# Patient Record
(demographics unavailable — no encounter records)

---

## 2024-10-29 NOTE — REVIEW OF SYSTEMS
[Negative] : Heme/Lymph [Chest Discomfort] : chest discomfort [Weight Gain (___ Lbs)] : no recent weight gain [Weight Loss (___ Lbs)] : no recent weight loss [Dyspnea on exertion] : not dyspnea during exertion [Palpitations] : no palpitations [Syncope] : no syncope [FreeTextEntry5] : See HPI [FreeTextEntry7] : See HPI

## 2024-10-29 NOTE — REASON FOR VISIT
[FreeTextEntry1] : Cardiology consultation for patient with old left bundle branch block with new fluttering and perhaps symptomatic VPCs. Stress echocardiogram revealed no ischemia or cardiomyopathy, rate related left bundle branch block and symptomatic VPCs. She was started on bisoprolol 5 mg daily. Here in follow-up after brief hospitalization for chest pain.

## 2024-10-29 NOTE — ASSESSMENT
[FreeTextEntry1] : Mrs. Garcia has an old left bundle branch block with a history of rheumatic heart disease and a somewhat abnormal cardiac exam. Stress echocardiogram 8 years ago, showed mild calcification of the aortic valve and otherwise normal LV function, and no ischemia. No arrhythmias were noted then. She was having intermittent palpitations. It sounded like just individual extra beats. There was one VPC at the end of her EKG tracing with her internist that she recalled feeling during the test. Doing well overall with only rare palpitations. Does not exercise at all. In March, 2019, found to have a narrow QRS at rest with rate-related left bundle branch block at heart rate 120 and above. Had a very inappropriate sinus tachycardia to minimal exercise. Mild progression of her aortic stenosis and mitral regurgitation with still normal LV and RV function. Long discussion about the benefits of keeping her heart rate down both in terms of exercise capacity, keeping away the left bundle branch block as this occasionally can affect LV contractility, although again, the patient is not very symptomatic. Elected to try bisoprolol 5 mg which seems to be working very well. Told her we could always try a higher dose but for now she wants to remain on this dose. She continues to do well and had stress echocardiogram 12/2020. No left bundle branch block until heart rate was 130 this time. She was able to do more than previously and the only abnormality was the rate related left bundle branch block and the abnormal septal motion post exercise seen because of the left bundle branch block. Mild AS and MR.. Was here June 13, 2023 and has been mostly asymptomatic since with no complaints, no Covid issues. Exam mostly unchanged with murmurs of AS and mild MR. No palpitations. No left bundle branch block again today. Medicines unchanged. Last ETT and echo almost 3 years. Still ALVAREZ.  Patient returned for echo on January ninth and then here today January 18 for stress echo and follow-up. Seems mostly asymptomatic unless she really pushes herself. Exam remarkable for her aortic stenosis murmur mostly unchanged. May be an S4 noted today. Her echo on the ninth as mentioned just had mild to moderate aortic stenosis, possibly ROBERT mostly proximal septum and otherwise normal LVEF. On his stress test today she was able to do 7 minutes and got her heart rate to 135 without going into a left bundle branch block. Also no VPCs so her beta-blocker is doing a good job. Labs will be checked especially proBNP. If no change she will come back in 6 months just for a visit and then we can be addressed if and when she needs her next echo and neck stress test.  Patient returned July 18, 2024. Has a couple of vague unusual symptoms that only happen every few days and last just a few seconds which may be related to her VPCs. No symptoms to suggest aortic valve disease although her aortic stenosis murmur is a little louder today. Healthy overall and labs will be sent. If proBNP elevated for the first time, would bring her back already for an echo with her visit in January. Otherwise we will just have her return in January for clinical visit and then decide if there is any need for further stress testing or echo etc.      Patient returns today October 29, 2024 after a brief hospitalization last week for chest pain including a CT angio of her coronaries as mentioned above.  She does have a mild nonobstructive plaque around the proximal LAD and proximal RCA with a low calcium score of 109.  Her symptoms do not sound ischemic and I do not believe she needs further cardiac workup at this time and will be having further GI workup as mentioned.  She also had an echo with results as mentioned above with mild valvular disease and normal LV and RV function.  Labs were reviewed as well including lipid profile as mentioned.  For now I would not change any medication and I reassured her that she has had a fairly good workup of her heart.  Her left bundle branch block is back today with a mild first-degree AV block as well but she has no symptoms of bradycardia syncope near syncope etc.

## 2024-10-29 NOTE — PHYSICAL EXAM
[General Appearance - Well Developed] : well developed [Normal Appearance] : normal appearance [Well Groomed] : well groomed [General Appearance - Well Nourished] : well nourished [No Deformities] : no deformities [General Appearance - In No Acute Distress] : no acute distress [Normal Conjunctiva] : the conjunctiva exhibited no abnormalities [Eyelids - No Xanthelasma] : the eyelids demonstrated no xanthelasmas [Normal Oral Mucosa] : normal oral mucosa [No Oral Pallor] : no oral pallor [No Oral Cyanosis] : no oral cyanosis [Normal Jugular Venous A Waves Present] : normal jugular venous A waves present [Normal Jugular Venous V Waves Present] : normal jugular venous V waves present [No Jugular Venous Haque A Waves] : no jugular venous haque A waves [Respiration, Rhythm And Depth] : normal respiratory rhythm and effort [Exaggerated Use Of Accessory Muscles For Inspiration] : no accessory muscle use [Auscultation Breath Sounds / Voice Sounds] : lungs were clear to auscultation bilaterally [Heart Rate And Rhythm] : heart rate and rhythm were normal [Abdomen Soft] : soft [Abdomen Mass (___ Cm)] : no abdominal mass palpated [Abnormal Walk] : normal gait [Gait - Sufficient For Exercise Testing] : the gait was sufficient for exercise testing [Nail Clubbing] : no clubbing of the fingernails [Cyanosis, Localized] : no localized cyanosis [Petechial Hemorrhages (___cm)] : no petechial hemorrhages [Skin Color & Pigmentation] : normal skin color and pigmentation [] : no rash [No Venous Stasis] : no venous stasis [Skin Lesions] : no skin lesions [No Skin Ulcers] : no skin ulcer [No Xanthoma] : no  xanthoma was observed [Oriented To Time, Place, And Person] : oriented to person, place, and time [Affect] : the affect was normal [Mood] : the mood was normal [FreeTextEntry1] : Not  anxious.

## 2024-10-29 NOTE — HISTORY OF PRESENT ILLNESS
[FreeTextEntry1] : October 3, 2012. Patient is a 60-year-old woman who went for her annual physical with her internist. He found a left bundle branch block which was new from previous EKG one year ago. The patient has a history of rheumatic fever as a child and was even hospitalized for about 5 days. At that time there was no cardiac involvement. She is unaware of any cardiac involvement since then. She thinks she had a normal echocardiogram about 10 years ago. She denies chest pain or shortness of breath and she is fairly active and exercises without symptoms. In terms of risk factors for coronary artery disease, there is hyperlipidemia and a borderline family history with a father who had an MI and  at age 78. She is a nonsmoker and has never been told of hypertension or diabetes. 2012. Patient returned for stress echocardiogram. No evidence of ischemia or arrhythmias to a heart rate of 169, blood pressure 180/80. Normal echocardiogram except for paradoxical septal motion from left bundle branch block. No echocardiographic evidence of ischemia. 2018. Patient last here 5-1/2 years ago. Her new symptom now is some fluttering in her chest, which can last all day and then can go days without it. Just seems like single extra beats. While having an EKG in her internist's office she had a VPC at the end, which was symptomatic, and similar to her symptoms. No dizziness, chest pain, shortness of breath. No interval medical issues in terms of cardiac disease, just an epidural injection in her neck in the past and negative colonoscopy 4 years ago. She had recent labs that were within normal limits and a cholesterol of 196, HDL 60, , triglycerides 120. EKG showed left bundle branch block that was unchanged.  2018. Patient returned for stress echocardiogram. Exercised only 4 minutes to a heart rate 156. Left bundle branch block. No chest pain. No echocardiographic evidence of ischemia. Resting echo with mild MR and mild to moderate AS with peak gradient 18. Normal LV function other than paradoxical septal motion. Normal RV function 2019. Patient here for followup and for stress echocardiogram. No real symptoms, but not really exercising much at all. Echocardiogram with slightly worse AS and MR. Normal LV function. A narrow QRS at rest. Develops left bundle branch block with heart rate 120 in stage I and has very abnormally high heart rate response to mild exercise. Mild septal wall motion abnormality in recovery from left bundle branch block, but no suspicion for ischemia. After long discussion with the patient about her symptomatic VPCs, her very poor heart rate control and her rate related left bundle branch block, elected to try low-dose beta blocker with bisoprolol 5 mg daily. 2019. Patient returns in followup. Tolerating the bisoprolol. Unclear if she gets less palpitations or less anxious. No negative side effects. 2019.  Patient here in follow-up.  EKG is sinus rhythm at 60 and is a normal tracing, no left bundle branch block.  Had a full physical with labs with Dr. Baxter 2 weeks ago, results pending.  Very rare palpitations now.  She did stop her amlodipine 5 days ago because her home machine was getting readings of 80 and 90 systolic even though she felt perfectly fine.  She will bring the machine in next time to compare. 2020.  Patient here in follow-up.  Her blood pressure is excellent at 113/72 her weight is up 5 pounds.  Her EKG shows sinus rhythm with a narrow complex and is a normal tracing.  No left bundle branch block.  No chest pain shortness of breath, just upset that she gained 5 pounds because of inactivity and COVID. 2020.  Patient returns for follow-up and for stress echocardiogram.  Has been feeling well.  No Covid issues.  Had labs with Dr. Baxter on  with a normal CBC, normal kidney function with potassium 5.4, cholesterol 164, triglycerides 118, HDL 54, LDL 86.  Normal thyroid function and hemoglobin A1c 5.4.  She was able to exercise for 7 minutes this time without chest pain just shortness of breath.  Did not develop a left bundle branch block until heart rate was 130 in stage III and up until that point there were no ST changes.  Absolutely no VPCs.  In recovery with a left bundle branch block finally resolved at about 3 minutes with a heart rate of 100.  The echocardiogram post exercise just had some abnormal septal motion from the left bundle branch block but otherwise no ischemia and the mild aortic stenosis was really unchanged. 2021.  Patient here in follow-up.  Remains with sinus bradycardia at 57 with a narrow QRS, no left bundle branch block, normal ECG.  Blood pressure excellent.  Weight up 3 pounds.  No interval medical issues, hospital visits, emergency room visits change in medication etc.  As a matter fact she thinks the last doctor she saw was me last December.  No chest pain shortness of breath or palpitations. 2021.  Patient returns in follow-up.  Remains in sinus rhythm at 65 again with a narrow complex QRS.  Had labs on  with Dr. Baxter.  Normal CBC.  Potassium 6.4 although probably hemolyzed.  BUN 19 creatinine 0.9.  Normal LFTs.  Cholesterol 194, triglycerides 137, HDL 56, .  Normal TSH normal CK and does have high antibodies for Covid.  Did not have her booster yet.  Did have a flu shot.  No chest pain shortness of breath or any medical issues in the interim. 2022.  Patient returns here in follow-up for clearance for upcoming cataract surgery.  She denies any new chest pain, shortness of breath, palpitations etc.  No COVID issues.  She had the 2 vaccines but not have the booster.  No interval hospitalizations or emergency room visits or any procedures.  She will need both eyes done and the right eye is scheduled for August.  Her EKG here is sinus rhythm at 57 with a narrow complex QRS, no left bundle branch block.  There has been no change in her medications. 2022.  Patient had no problem with cataract surgery, had both eyes done and excellent results.  No interval medical or cardiac issues.  No left bundle branch block last couple of visits and none when she went for the cataract surgery.  No COVID issues.  Still one of the few people who has never had COVID.  Labs from Dr. Jesus Baxter dated  with normal CBC, cholesterol 189, triglycerides 105, HDL 63, .  Chemistries within normal limits with potassium 5.1 and normal renal function.  Normal TSH as well. 2023. Doing well, no complaints. Still never had Covid. No LBBB today. 2024. Patient came for 2D echo. Normal LVEF at 65 to 70%. Normal diastolic function. Mild to moderate AS with a valve area 1.45 cm and no AI. Mild MR. Mild TR with PAS 25. Normal RV size and function. Patient scheduled for stress echo on .   2024. Patient returns for stress echo along with follow-up. No real complaints but has not really done much exercise since her last stress test and was a little nervous about it.  In fact she was able to do 7 minutes again this time reaching a heart rate of 135 without developing the left bundle branch block.  No chest pain.  More fatigued than shortness of breath.  No ST changes.  No echocardiographic evidence of ischemia. She denies any interval medical issues or change in medication.  She claims she had labs in November with her PCP and as far she knows all was okay.  Most likely did not have a proBNP.  We reviewed her echo results that were done on the  as well (she got nervous when she saw the words stenosis by the aortic valve) 2024.  Patient returns in follow-up.  EKG is sinus rhythm, normal tracing, no left bundle branch block.  Blood pressure excellent and weight stable. She has 2 symptoms, 1 what sounds like a sudden something coming up her chest and may be into her throat and the other is she feels like gets a little pause or dizziness or lightheadedness for a second and then comes right back.  Perhaps 1 or both are VPCs with compensatory pause but they are not that frequent to be of concern.  So far they are not frequent enough that she would want a medicine to suppress them.  No interval hospitalizations medical or cardiac issues or change in medication.  She does need a refill on her bisoprolol.  No symptoms of aortic valve disease so far. "(She moved to [place] and did not realize how long it would take her to come here if it is during rush hour.) 2024.  Patient returns in follow-up.  She was having some chest discomfort which was midsternal but on further description seems to be more of a pulsating or pounding nature.  She had recently had a GI workup including endoscopy and was told everything was fine and that she should get her heart checked out.  She went to [facility] and had an extensive workup.  Labs including troponin were normal.  EKGs were normal.  She had a CT angio to rule out pulmonary emboli.  She also had a CT angio of her coronary arteries.  The calcium score was only 109 with 41 in the LAD and 68 in the RCA there were no plaques detected in the left main the diagonal the circumflex or the RCA branches.  There was a mild calcified plaque in the proximal RCA and a mild calcified plaque diffusely in the LAD.  She also had an echocardiogram that was unremarkable with normal LV size and function, normal RV size and function, 1+ MR, mild MS, no aortic valve disease, and trace TR.  No pericardial effusion.  Since then her gastroenterologist doubled up her antireflux medications and she will also be having further GI workup.  Other labs that she had at the hospital included a lipid profile with cholesterol 170, triglycerides 107, HDL 59, and LDL 90.  Routine chemistries were within normal limits except a mildly elevated BUN of 24 with a normal creatinine of 0.94.  She comes to the office today in follow-up and is back in her left bundle branch block today with sinus rhythm at 69 and a borderline first-degree AV block as well.  There is some sinus arrhythmia.  Reviewing his symptoms there is no exertional component and again this seems to be a possible pulsatile nature but there is a positional nature to with may be consistent with acid reflux.

## 2024-10-29 NOTE — DISCUSSION/SUMMARY
[EKG obtained to assist in diagnosis and management of assessed problem(s)] : EKG obtained to assist in diagnosis and management of assessed problem(s) [FreeTextEntry1] : Reviewed all of her hospital records, her labs etc.  She should remain on her current medications.  Consideration could be made for aspirin therapy given her mild nonobstructive disease but given the current symptoms and workup of possible reflux I would hold off for now but she does remain on bisoprolol pravastatin and amlodipine.  If no new issues arise she can follow-up in 4 to 6 months.

## 2024-10-29 NOTE — END OF VISIT
April 3, 2020     Patient: Eneida Grewal   YOB: 1946   Date of Visit: 4/3/2020       To Whom it May Concern:    Eneida Grewal had a telphone visit  On  4/3/2020.    tSella Grewal works as RN at [employer] . 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  She is requesting to be tested .for covid 19  Pt was recommended to contact her employer and follow the instruction.        Sincerely,       Eliecer Ayala MD    Medical information is confidential and cannot be disclosed without the written consent of the patient or her representative.       [Time Spent: ___ minutes] : I have spent [unfilled] minutes of time on the encounter which excludes teaching and separately reported services.

## 2025-05-29 NOTE — DISCUSSION/SUMMARY
[FreeTextEntry1] : Patient seems to be doing well.  Intermittent left bundle branch block.  Mildly abnormal CTA of the coronaries.  Mild aortic stenosis.  If no new symptoms and labs are okay including proBNP we will just have her come back in 6 months. [EKG obtained to assist in diagnosis and management of assessed problem(s)] : EKG obtained to assist in diagnosis and management of assessed problem(s)

## 2025-05-29 NOTE — REVIEW OF SYSTEMS
[Negative] : Heme/Lymph [Weight Gain (___ Lbs)] : no recent weight gain [Weight Loss (___ Lbs)] : no recent weight loss [Dyspnea on exertion] : not dyspnea during exertion [Chest Discomfort] : no chest discomfort [Palpitations] : no palpitations [Syncope] : no syncope [FreeTextEntry5] : See HPI [FreeTextEntry7] : See HPI

## 2025-05-29 NOTE — HISTORY OF PRESENT ILLNESS
[FreeTextEntry1] : October 3, 2012. Patient is a 60-year-old woman who went for her annual physical with her internist. He found a left bundle branch block which was new from previous EKG one year ago. The patient has a history of rheumatic fever as a child and was even hospitalized for about 5 days. At that time there was no cardiac involvement. She is unaware of any cardiac involvement since then. She thinks she had a normal echocardiogram about 10 years ago. She denies chest pain or shortness of breath and she is fairly active and exercises without symptoms. In terms of risk factors for coronary artery disease, there is hyperlipidemia and a borderline family history with a father who had an MI and  at age 78. She is a nonsmoker and has never been told of hypertension or diabetes. 2012. Patient returned for stress echocardiogram. No evidence of ischemia or arrhythmias to a heart rate of 169, blood pressure 180/80. Normal echocardiogram except for paradoxical septal motion from left bundle branch block. No echocardiographic evidence of ischemia. 2018. Patient last here 5-1/2 years ago. Her new symptom now is some fluttering in her chest, which can last all day and then can go days without it. Just seems like single extra beats. While having an EKG in her internist's office she had a VPC at the end, which was symptomatic, and similar to her symptoms. No dizziness, chest pain, shortness of breath. No interval medical issues in terms of cardiac disease, just an epidural injection in her neck in the past and negative colonoscopy 4 years ago. She had recent labs that were within normal limits and a cholesterol of 196, HDL 60, , triglycerides 120. EKG showed left bundle branch block that was unchanged.  2018. Patient returned for stress echocardiogram. Exercised only 4 minutes to a heart rate 156. Left bundle branch block. No chest pain. No echocardiographic evidence of ischemia. Resting echo with mild MR and mild to moderate AS with peak gradient 18. Normal LV function other than paradoxical septal motion. Normal RV function 2019. Patient here for followup and for stress echocardiogram. No real symptoms, but not really exercising much at all. Echocardiogram with slightly worse AS and MR. Normal LV function. A narrow QRS at rest. Develops left bundle branch block with heart rate 120 in stage I and has very abnormally high heart rate response to mild exercise. Mild septal wall motion abnormality in recovery from left bundle branch block, but no suspicion for ischemia. After long discussion with the patient about her symptomatic VPCs, her very poor heart rate control and her rate related left bundle branch block, elected to try low-dose beta blocker with bisoprolol 5 mg daily. 2019. Patient returns in followup. Tolerating the bisoprolol. Unclear if she gets less palpitations or less anxious. No negative side effects. 2019.  Patient here in follow-up.  EKG is sinus rhythm at 60 and is a normal tracing, no left bundle branch block.  Had a full physical with labs with Dr. Baxter 2 weeks ago, results pending.  Very rare palpitations now.  She did stop her amlodipine 5 days ago because her home machine was getting readings of 80 and 90 systolic even though she felt perfectly fine.  She will bring the machine in next time to compare. 2020.  Patient here in follow-up.  Her blood pressure is excellent at 113/72 her weight is up 5 pounds.  Her EKG shows sinus rhythm with a narrow complex and is a normal tracing.  No left bundle branch block.  No chest pain shortness of breath, just upset that she gained 5 pounds because of inactivity and COVID. 2020.  Patient returns for follow-up and for stress echocardiogram.  Has been feeling well.  No Covid issues.  Had labs with Dr. Baxter on  with a normal CBC, normal kidney function with potassium 5.4, cholesterol 164, triglycerides 118, HDL 54, LDL 86.  Normal thyroid function and hemoglobin A1c 5.4.  She was able to exercise for 7 minutes this time without chest pain just shortness of breath.  Did not develop a left bundle branch block until heart rate was 130 in stage III and up until that point there were no ST changes.  Absolutely no VPCs.  In recovery with a left bundle branch block finally resolved at about 3 minutes with a heart rate of 100.  The echocardiogram post exercise just had some abnormal septal motion from the left bundle branch block but otherwise no ischemia and the mild aortic stenosis was really unchanged. 2021.  Patient here in follow-up.  Remains with sinus bradycardia at 57 with a narrow QRS, no left bundle branch block, normal ECG.  Blood pressure excellent.  Weight up 3 pounds.  No interval medical issues, hospital visits, emergency room visits change in medication etc.  As a matter fact she thinks the last doctor she saw was me last December.  No chest pain shortness of breath or palpitations. 2021.  Patient returns in follow-up.  Remains in sinus rhythm at 65 again with a narrow complex QRS.  Had labs on  with Dr. Baxter.  Normal CBC.  Potassium 6.4 although probably hemolyzed.  BUN 19 creatinine 0.9.  Normal LFTs.  Cholesterol 194, triglycerides 137, HDL 56, .  Normal TSH normal CK and does have high antibodies for Covid.  Did not have her booster yet.  Did have a flu shot.  No chest pain shortness of breath or any medical issues in the interim. 2022.  Patient returns here in follow-up for clearance for upcoming cataract surgery.  She denies any new chest pain, shortness of breath, palpitations etc.  No COVID issues.  She had the 2 vaccines but not have the booster.  No interval hospitalizations or emergency room visits or any procedures.  She will need both eyes done and the right eye is scheduled for August.  Her EKG here is sinus rhythm at 57 with a narrow complex QRS, no left bundle branch block.  There has been no change in her medications. 2022.  Patient had no problem with cataract surgery, had both eyes done and excellent results.  No interval medical or cardiac issues.  No left bundle branch block last couple of visits and none when she went for the cataract surgery.  No COVID issues.  Still one of the few people who has never had COVID.  Labs from Dr. Jesus Baxter dated  with normal CBC, cholesterol 189, triglycerides 105, HDL 63, .  Chemistries within normal limits with potassium 5.1 and normal renal function.  Normal TSH as well. 2023. Doing well, no complaints. Still never had Covid. No LBBB today. 2024. Patient came for 2D echo. Normal LVEF at 65 to 70%. Normal diastolic function. Mild to moderate AS with a valve area 1.45 cm and no AI. Mild MR. Mild TR with PAS 25. Normal RV size and function. Patient scheduled for stress echo on .   2024. Patient returns for stress echo along with follow-up. No real complaints but has not really done much exercise since her last stress test and was a little nervous about it.  In fact she was able to do 7 minutes again this time reaching a heart rate of 135 without developing the left bundle branch block.  No chest pain.  More fatigued than shortness of breath.  No ST changes.  No echocardiographic evidence of ischemia. She denies any interval medical issues or change in medication.  She claims she had labs in November with her PCP and as far she knows all was okay.  Most likely did not have a proBNP.  We reviewed her echo results that were done on the  as well (she got nervous when she saw the words stenosis by the aortic valve) 2024.  Patient returns in follow-up.  EKG is sinus rhythm, normal tracing, no left bundle branch block.  Blood pressure excellent and weight stable. She has 2 symptoms, 1 what sounds like a sudden something coming up her chest and may be into her throat and the other is she feels like gets a little pause or dizziness or lightheadedness for a second and then comes right back.  Perhaps 1 or both are VPCs with compensatory pause but they are not that frequent to be of concern.  So far they are not frequent enough that she would want a medicine to suppress them.  No interval hospitalizations medical or cardiac issues or change in medication.  She does need a refill on her bisoprolol.  No symptoms of aortic valve disease so far. "(She moved to [place] and did not realize how long it would take her to come here if it is during rush hour.) 2024.  Patient returns in follow-up.  She was having some chest discomfort which was midsternal but on further description seems to be more of a pulsating or pounding nature.  She had recently had a GI workup including endoscopy and was told everything was fine and that she should get her heart checked out.  She went to [facility] and had an extensive workup.  Labs including troponin were normal.  EKGs were normal.  She had a CT angio to rule out pulmonary emboli.  She also had a CT angio of her coronary arteries.  The calcium score was only 109 with 41 in the LAD and 68 in the RCA there were no plaques detected in the left main the diagonal the circumflex or the RCA branches.  There was a mild calcified plaque in the proximal RCA and a mild calcified plaque diffusely in the LAD.  She also had an echocardiogram that was unremarkable with normal LV size and function, normal RV size and function, 1+ MR, mild MS, no aortic valve disease, and trace TR.  No pericardial effusion.  Since then her gastroenterologist doubled up her antireflux medications and she will also be having further GI workup.  Other labs that she had at the hospital included a lipid profile with cholesterol 170, triglycerides 107, HDL 59, and LDL 90.  Routine chemistries were within normal limits except a mildly elevated BUN of 24 with a normal creatinine of 0.94.  She comes to the office today in follow-up and is back in her left bundle branch block today with sinus rhythm at 69 and a borderline first-degree AV block as well.  There is some sinus arrhythmia.  Reviewing his symptoms there is no exertional component and again this seems to be a possible pulsatile nature but there is a positional nature to with may be consistent with acid reflux. May 29, 2025.  First visit since last October.  Denies chest pain shortness of breath or any interval hospitalizations urgent care or emergency room etc.  Blood pressure excellent.  Weight up 5 pounds. "I am lazy I do not exercise or do anything."  EKG is sinus rhythm at 63 and within normal limits, no left bundle branch block..

## 2025-05-29 NOTE — ASSESSMENT
[FreeTextEntry1] : Mrs. Garcia has an old left bundle branch block with a history of rheumatic heart disease and a somewhat abnormal cardiac exam. Stress echocardiogram 8 years ago, showed mild calcification of the aortic valve and otherwise normal LV function, and no ischemia. No arrhythmias were noted then. She was having intermittent palpitations. It sounded like just individual extra beats. There was one VPC at the end of her EKG tracing with her internist that she recalled feeling during the test. Doing well overall with only rare palpitations. Does not exercise at all. In March, 2019, found to have a narrow QRS at rest with rate-related left bundle branch block at heart rate 120 and above. Had a very inappropriate sinus tachycardia to minimal exercise. Mild progression of her aortic stenosis and mitral regurgitation with still normal LV and RV function. Long discussion about the benefits of keeping her heart rate down both in terms of exercise capacity, keeping away the left bundle branch block as this occasionally can affect LV contractility, although again, the patient is not very symptomatic. Elected to try bisoprolol 5 mg which seems to be working very well. Told her we could always try a higher dose but for now she wants to remain on this dose. She continues to do well and had stress echocardiogram 12/2020. No left bundle branch block until heart rate was 130 this time. She was able to do more than previously and the only abnormality was the rate related left bundle branch block and the abnormal septal motion post exercise seen because of the left bundle branch block. Mild AS and MR.. Was here June 13, 2023 and has been mostly asymptomatic since with no complaints, no Covid issues. Exam mostly unchanged with murmurs of AS and mild MR. No palpitations. No left bundle branch block again today. Medicines unchanged. Last ETT and echo almost 3 years. Still ALVAREZ.  Patient returned for echo on January ninth and then here today January 18 for stress echo and follow-up. Seems mostly asymptomatic unless she really pushes herself. Exam remarkable for her aortic stenosis murmur mostly unchanged. May be an S4 noted today. Her echo on the ninth as mentioned just had mild to moderate aortic stenosis, possibly ROBERT mostly proximal septum and otherwise normal LVEF. On his stress test today she was able to do 7 minutes and got her heart rate to 135 without going into a left bundle branch block. Also no VPCs so her beta-blocker is doing a good job. Labs will be checked especially proBNP. If no change she will come back in 6 months just for a visit and then we can be addressed if and when she needs her next echo and neck stress test.  Patient returned July 18, 2024. Has a couple of vague unusual symptoms that only happen every few days and last just a few seconds which may be related to her VPCs. No symptoms to suggest aortic valve disease although her aortic stenosis murmur is a little louder today. Healthy overall and labs will be sent. If proBNP elevated for the first time, would bring her back already for an echo with her visit in January. Otherwise we will just have her return in January for clinical visit and then decide if there is any need for further stress testing or echo etc.  Patient returned October 29, 2024 after a brief hospitalization last week for chest pain including a CT angio of her coronaries as mentioned above. She does have a mild nonobstructive plaque around the proximal LAD and proximal RCA with a low calcium score of 109. Her symptoms do not sound ischemic and I do not believe she needs further cardiac workup at this time and will be having further GI workup as mentioned. She also had an echo with results as mentioned above with mild valvular disease and normal LV and RV function. Labs were reviewed as well including lipid profile as mentioned. For now I would not change any medication and I reassured her that she has had a fairly good workup of her heart. Her left bundle branch block is back today with a mild first-degree AV block as well but she has no symptoms of bradycardia syncope near syncope etc.      May 29, 2025.  Patient returns in follow-up.  No interval medical or cardiac issues.  Exam has mild aortic stenosis murmur with radiation to the carotids.  Otherwise unremarkable exam and EKG does not have the left bundle branch block and still has sinus rhythm and is otherwise unremarkable, no LVH, borderline first-degree AV block.  Labs were sent.  She continues to follow-up with Dr. Jesus Baxter.

## 2025-05-29 NOTE — PHYSICAL EXAM
[General Appearance - Well Developed] : well developed [Normal Appearance] : normal appearance [Well Groomed] : well groomed [General Appearance - Well Nourished] : well nourished [No Deformities] : no deformities [General Appearance - In No Acute Distress] : no acute distress [Normal Conjunctiva] : the conjunctiva exhibited no abnormalities [Eyelids - No Xanthelasma] : the eyelids demonstrated no xanthelasmas [Normal Oral Mucosa] : normal oral mucosa [No Oral Pallor] : no oral pallor [No Oral Cyanosis] : no oral cyanosis [Normal Jugular Venous A Waves Present] : normal jugular venous A waves present [Normal Jugular Venous V Waves Present] : normal jugular venous V waves present [No Jugular Venous Haque A Waves] : no jugular venous haque A waves [Respiration, Rhythm And Depth] : normal respiratory rhythm and effort [Exaggerated Use Of Accessory Muscles For Inspiration] : no accessory muscle use [Auscultation Breath Sounds / Voice Sounds] : lungs were clear to auscultation bilaterally [Heart Rate And Rhythm] : heart rate and rhythm were normal [Abdomen Soft] : soft [Abdomen Mass (___ Cm)] : no abdominal mass palpated [Abnormal Walk] : normal gait [Gait - Sufficient For Exercise Testing] : the gait was sufficient for exercise testing [Nail Clubbing] : no clubbing of the fingernails [Cyanosis, Localized] : no localized cyanosis [Petechial Hemorrhages (___cm)] : no petechial hemorrhages [Skin Color & Pigmentation] : normal skin color and pigmentation [] : no rash [No Venous Stasis] : no venous stasis [Skin Lesions] : no skin lesions [No Skin Ulcers] : no skin ulcer [No Xanthoma] : no  xanthoma was observed [Affect] : the affect was normal [Oriented To Time, Place, And Person] : oriented to person, place, and time [Mood] : the mood was normal [FreeTextEntry1] : Not  anxious.